# Patient Record
Sex: FEMALE | Race: WHITE | NOT HISPANIC OR LATINO | URBAN - METROPOLITAN AREA
[De-identification: names, ages, dates, MRNs, and addresses within clinical notes are randomized per-mention and may not be internally consistent; named-entity substitution may affect disease eponyms.]

---

## 2018-11-20 LAB
BILIRUB SERPL-MCNC: NEGATIVE MG/DL
BLOOD URINE, POC: NORMAL
CLARITY, POC UA: CLEAR
COLOR, POC UA: YELLOW
GLUCOSE UR QL STRIP: NEGATIVE
KETONES UR QL STRIP: NEGATIVE
LEUKOCYTE EST, POC UA: NEGATIVE
NITRITE, POC UA: NEGATIVE
PH, POC UA: 6
PROTEIN, POC: NEGATIVE
SPECIFIC GRAVITY, POC UA: 1.02
UROBILINOGEN, POC UA: NORMAL

## 2021-01-26 ENCOUNTER — HISTORICAL (OUTPATIENT)
Dept: ADMINISTRATIVE | Facility: HOSPITAL | Age: 31
End: 2021-01-26

## 2021-01-26 LAB — SARS-COV-2 RNA RESP QL NAA+PROBE: NOT DETECTED

## 2021-01-28 ENCOUNTER — HISTORICAL (OUTPATIENT)
Dept: SURGERY | Facility: HOSPITAL | Age: 31
End: 2021-01-28

## 2022-04-11 ENCOUNTER — HISTORICAL (OUTPATIENT)
Dept: ADMINISTRATIVE | Facility: HOSPITAL | Age: 32
End: 2022-04-11

## 2022-04-24 VITALS
SYSTOLIC BLOOD PRESSURE: 116 MMHG | DIASTOLIC BLOOD PRESSURE: 78 MMHG | WEIGHT: 293 LBS | HEIGHT: 65 IN | BODY MASS INDEX: 48.82 KG/M2 | OXYGEN SATURATION: 95 %

## 2022-04-30 NOTE — PROGRESS NOTES
Patient:   Nani Belcher             MRN: [id number]            FIN: [id number]               Age:   30 years     Sex:  Female     :  1990   Associated Diagnoses:   None   Author:   Milagros Piña MD      Pre-Op Dx:  30 F P2 with elective sterilization, Hx C/S x 2, ventral hernia repair as a child        Plan: L[facility] BS, IUD removal    Reviewed resident's H&P.  Agree with plan.  Proceed with case

## 2022-05-05 NOTE — HISTORICAL [facility] CERNER
This is a historical note converted from [employer]. Formatting and pictures may have been removed.  Please reference [employer] for original formatting and attached multimedia. Interval H&P  No interval changes to H&P. Pt feels well and has no acute complaints. She is currently menstruating (LMP 2020) with Mirena IUD in place.  Pt desires permanent sterilization via laparoscopic bilateral salpingectomy as well as Mirena IUD removal. Consents signed and on chart.  ?  ?Event Name? Event Result? Date/Time?   Temperature Oral 36.8 DegC 21 10:17:47   Peripheral Pulse Rate 78 bpm 21 10:17:47   Respiratory Rate 20 br/min 21 11:00:00   SpO2 98 % 21 11:00:00   SpO2 96 % 21 10:17:47   Systolic Blood Pressure 128 mmHg 21 10:17:47   Diastolic Blood Pressure 88 mmHg 21 10:17:47   Mean Arterial Pressure, Cuff 101 mmHg 21 10:17:47   ?  PEX  Gen - NAD, A & Ox3  Pulm - Breathing comfortably on room air  CV - RRR  Abd - Soft, NT, ND  Ext - SCDs in place  ?   Pts partner is present and would like to be called after procedure - Deven Du [phone].  ?  ?   Kiki Gohtra MD PGY2  [facility]  ?  ?  Chief Complaint  ?  PREOP  History of Present Illness  31 yo  here?with undesired fertility here to discuss permanent sterilization  ?  Medicaid sterilization consents singed 20  Mirena IUD in place x 5 years - desires removal under anesthesia  Gynecological History  Last Menstrual Period: 20  Menstrual Status Intake: Menarcheal  Denies history of abnormal paps. Last pap -NIL and HPV neg.  Denies h/o of STIs  Denies?family?hx of breast, ovarian, uterine or colon cancer.  Review of Systems  *Positive ROS are in bold, otherwise negative  ?  General: weight loss, weight gain, fevers/chills  GI: abdominal pain, diarrhea, constipation  CV: chest pain, palpitations  Resp: SOB, chronic cough, wheezing  Gyn: see HPI  : see HPI  Neuro: migraine headaches, frequent  dizziness  MSK: joint, back pain  Physical Exam  General: NAD, A/Ox3. Well appearing.?  Respiratory: CTAB, no wheezes, rales, or rhonchi  Cardiovascular: RRR no murmurs, rubs, or gallops  Abdomen: soft, nondistended, nontender to palpation, no masses palpated, normoactive bowel sounds  Incisions: well healed pfanenstiel incison  Extremities: no edema, no calf tenderness  External genitalia: Normal female anatomy, no masses/lesions. Normal appearing urethral meatus. Normal appearing external anus.  Speculum exam: vaginal mucosa normal in appearance. Pink. No masses/lesions. Cervix well visualized, smooth in contour no masses or lesions. Os normal in appearance, no blood or discharge coming from the os.  Bimanual exam: Vagina with adequate?capacity. Uterus 8-10cm in size, no cervical motion tenderness. Smooth in contour, no masses. Minimal descent, mobile, not broad. No adnexal fullness/tenderness. Normal urethra. Normal bladder.?  Assessment/Plan  1.?Preop examination?Z01.818  ?  2.?Unwanted fertility?Z30.09  ???This procedure and its risks, reason, benefits, and complications were reviewed in detail. Complications discussed included injury to bowel, bladder, major blood vessels, ureter, bleeding, possible need for blood transfusion, infection, scarring, dyspareunia, further surgery (laparotomy), worsening incontinence, failure of procedure, or fistula formation. Patient amenable to blood transfusion if needed.  ?  ??Alternatives to this planned procedure were explained to the patient including expectant, medical and other types of surgical management.?She was counseled that this procedure is permanent and that?Studies show that people under 30 regret permanent sterilization 25% of the time. She confirmed that she wants no more children even in the event of death of one of her children or change in partner. She was counseled that salpingectomy reduces risk of ovarian cancer by removing the fimbriated end of the  fallopian tube but has a lower success rate of re anastomosis since the entire tube is removed. Medical options include condoms, pills, depo provera, patches, nexplanon, IUD, vasectomy for partner.  ?  ??Risks that are specific to this patient were also discussed including acquisition of COVID 19.  ?  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 She has consented to a pelvic?exam under anesthesia and? understands that medical students participate in this portion of the procedure. Surgical consents were signed and witnessed.  ?  We reviewed the typical perioperative course, anticipation of same day discharge home. Instructions reviewed, including NPO after midnight prior to surgery. Post-operative precautions were reviewed including no heavy lifting >10 lbs and nothing per vagina for 6 weeks.?  ?  ??[facility] appointment requested.  Consents reviewed with patient and signed.  Labs today: None  Labs DOS: T&S, Beta Hcg  Meds DOS: None  SCDs for DVT prophylaxis  Abx: None  Discussed?outpatient surgery expectations and recovery time.  ?  ??To OR for?Bilateral Laproscopic?Salpingectomy, Mirena?IUD?removal?on 21  Post-op appt: 2 weeks  ?  Discussed with Dr. Perla,  ?  Karl Massey MD PGY3  [facility]? ?  ?  ?  ?  ?  ?  ?  ?  ?  ?  ?  ?  ?  ?  ?  ?  ?  ?  ?  ?  ?  ?  ?  ?  ?  ?  ?  ?  ?  ?  OB History  Pregnancy History???(2,0,0,2)?? ??  Pregnancy # 1  Baby 1?????????????Outcome Date:?2012????? Outcome:?Live Birth  ???Outcome or Result:?  ???Gender:?--????????Gest Age:?40 weeks ??????Wt:?--  ???Hospital:?--????????[facility]:?--  ???Joanne Name:?--?????Babys Father:?--  ?  Pregnancy # 2  Baby 1?????????????Outcome Date:?2015????? Outcome:?Live Birth  ???Outcome or Result:?  ???Gender:?--????????Gest Age:?40 weeks ??????Wt:?--  ???Hospital:?--????????[facility]  Labor:?--  ???Joanne Name:?--?????Babys Father:?--  Problem List/Past Medical History  Ongoing  ??Morbid obesity  Procedure/Surgical History  ?c section x 2 2012 and 2015  ventral hernia repair - age 7   ?  Medications  No active medications  Allergies  No Known Allergies  Social History  Abuse/Neglect  ?No, 11/24/2020  Alcohol - Denies Alcohol Use, 10/14/2015  ?Never, 07/20/2016  Employment/School  ?Employed, Work/School description: [employer]., 07/20/2016  Exercise  ?Self assessment: Fair condition., 11/20/2018  Home/Environment  ?Lives with Children., 11/24/2020  Nutrition/Health  ?Regular, 11/20/2018  Sexual  ?Sexually active: Yes. Number of current partners 1., 11/20/2018  Spiritual/Cultural  ?[employer], 11/24/2020  Substance Use - Denies Substance Abuse, 10/14/2015  ?Never, 07/20/2016  Tobacco - Denies Tobacco Use, 10/14/2015  ?Never (less than 100 in lifetime), N/A, 11/21/2019  ?Marital Status  ?Single  Lab Results  ?  None  Diagnostic Results  None   ?  ?  Addendum by Pan LAINEZ, May S on January 15, 2021 12:27:42 CST (Verified)  I have seen this patient and agree with note above.  Patient and her  here today.?  Informed consent obtained, specifically I reiterated the risks of: pain, infection, bleeding, damage to internal organs, medical conditions with having general anesthetic; We reviewed that this is meant to be?permanent?and is not reversible in any capacity, risk of regret exists. ?We discussed that the only existing option in the future might be in vitro/assisted reproduction but may not be a viable option depending on circumstances. ?Also other unpredicted risks exist with having abdominal surgery. ?This will not directly affect her bleeding pattern, but cessation of hormonal contraception will.  Patient able to describe planned procedure in her own words.  Expected recovery time reviewed, as well as normal postoperative course.

## 2022-05-05 NOTE — HISTORICAL [facility] CERNER
This is a historical note converted from [employer]. Formatting and pictures may have been removed.  Please reference [employer] for original formatting and attached multimedia. Indication for Surgery  29 yo  with undesired fertility here for laparoscopic bilateral salpingectomy and Mirena IUD removal.  Preoperative Diagnosis  Undesired Fertility   Intrauterine Device  Postoperative Diagnosis  Same as above `  Operation  Laparoscopic Bilateral Salpingectomy  Mirena IUD Removal  Surgeon(s)  Kiki Ghotra MD PGY2  WangMilagros MD Staff  Assistant  Karl Massey MD PGY3  Anesthesia  General  Estimated Blood Loss  10mL  Urine Output  300mL  ?  IV Fluid  700mL  Findings  -EUA: Approximately 9 cm mobile uterus with?good?descent; smooth in contour without palpable uterine masses;?cervix?without lesions or masses; anterior in location; vaginal mucosa without lesions or masses; no adnexal masses or fullness appreciated  -Intraop: No visceral or vascular injury noted upon?entry into?abdomen. Normal upper abdomen, liver edge with gallbladder, and stomach.?Appendix clearly visualized.??Omental adhesive disease noted along anterior abdominal wall. Both filmy and dense?adhesive disease?noted along lower anterior uterine segment.?Slightly enlarged, nonglobular uterus with pebbled irregular appearance of serosa without masses. Normal bilateral ovaries.  Specimen(s)  Bilateral Fallopian Tubes  Complications  None  Technique  The patient was taken to the OR with IV fluids running. SCDs were applied to the lower extremities. General anesthesia was obtained and found to be adequate. She was positioned in the dorsal lithotomy position with Alex-type stirrups. EUA revealed the above. The patient was prepped and draped in the normal sterile fashion. A avery catheter was inserted to empty the bladder. The cervix was visualized and the anterior lip of the cervix was grasped with a single tooth tenaculum. An?HUMI uterine  manipulator was placed.?  ?   Attention was then paid to the laparoscopic portion of the procedure. 1% lidocaine was injected lateral to the umbilicus on the left side of the patient.? A skin incision was made with the scalpel.? The abdomen was elevated and the 5 mm long Visiport was introduced under direct visualization of the laparoscope.? All of the abdominal wall layers were identified.? Placement into the abdominal cavity was confirmed with visualization of the omentum.? The abdomen was then insufflated with CO2 to a pressure of 15 mmHg.? A survey of the abdomen was performed with the aforementioned findings as listed above.? The bowel and omentum immediately below the trocar entry site were inspected and no damage noted.? Two additional 5 mm trocars were inserted in a similar manner at the umbilicus and 6cm to the right of the umbilicus. The patient was then placed in Trendelenberg to facilitate visualization of the pelvis.? The pelvic anatomy was noted as above.  ?   The left fallopian tube was elevated away from the pelvic sidewall with atraumatic graspers. Using the ligasure instrument, the mesosalpinx was sequentially clamped, dessicated, and cut. The tube was then transected near the cornua after complete dessication. The tube was removed through the trocar and sent to pathology. In a similar fashion, the right fallopian tube was excised. Good hemostasis of the transected ends was noted.  ?   Two of the abdominal trocars were removed from the abdomen under direct visualization of the laparoscope.? The abdomen was then desufflated.? 5 manual breaths were given by anesthesia while 1 trocar remained in place.? The last trocar was then removed.? The trocar incisions were closed with 3-0 monocryl and skin adhesive.? Hemostasis was noted.???  ?  The patient tolerated the procedure well.? All instruments were removed from the abdomen and the vagina, and all counts were correct times two.? The patient was taken  to the recovery room in a stable condition.???  ?  Dr. Piña?was present for the procedure.  ?  ?  Kiki Ghotra MD?PGY2  [facility]  ?   I was present with the resident during all critical and key portions of the procedure and agree with the findings documented in the residents note.

## 2022-09-21 ENCOUNTER — HISTORICAL (OUTPATIENT)
Dept: ADMINISTRATIVE | Facility: HOSPITAL | Age: 32
End: 2022-09-21